# Patient Record
Sex: MALE | Race: WHITE | URBAN - METROPOLITAN AREA
[De-identification: names, ages, dates, MRNs, and addresses within clinical notes are randomized per-mention and may not be internally consistent; named-entity substitution may affect disease eponyms.]

---

## 2024-10-24 ENCOUNTER — CLINICAL DOCUMENTATION (OUTPATIENT)
Facility: HOSPITAL | Age: 64
End: 2024-10-24

## 2024-10-24 NOTE — PROGRESS NOTES
(Oscar Guillermo MD; 10/24/2024 2:36 PM)  Chest and Lung Exam  Auscultation  Breath sounds - Clear.    Cardiovascular  Auscultation  Rhythm - Regular. Heart Sounds - Normal heart sounds.    Abdomen  Palpation/Percussion  Palpation and Percussion of the abdomen reveal - Soft, Non Tender and No hepatosplenomegaly.  Auscultation  Auscultation of the abdomen reveals - Bowel sounds normal.    Peripheral Vascular  Upper Extremity  Palpation - Edema - Left - No edema - Left. Edema - Right - No edema - Right.        Current Plans  PTH INTACT (23925)  RENAL FUNCTION PANEL (37119)  SPOT PROTEIN URINE (86570)  SPOT URINE CREATININE(34074)  Patient was advised to notify our office of planned studies which include intravenous contrast.  Instructed to avoid non-steroidal anti-inflammatory drugs.    HLD (hyperlipidemia) (E78.5)    Current Plans  Patient was instructed to follow up with Primary Care Physician for management of lipids.    DM type 2 (diabetes mellitus, type 2) (E11.9) <HCCv24 19  HCCv28 38>    Current Plans  MICROALBUMIN / CREATININE RATIO (92644)  Patient was instructed to follow up with Primary Care Physician for management of diabetes.    HTN (hypertension) (I10)    Current Plans  CBC (18915)    Overweight (BMI 25.0 to 29.9) (E66.3)    Current Plans  LIFESTYLE EDUCATION REGARDING DIET (37844)  Pt Education - Healthy Diet: Brief Version *: healthy diet  Signed by Oscar Guillermo MD (10/24/2024 2:45 PM)